# Patient Record
Sex: FEMALE | Race: WHITE | NOT HISPANIC OR LATINO
[De-identification: names, ages, dates, MRNs, and addresses within clinical notes are randomized per-mention and may not be internally consistent; named-entity substitution may affect disease eponyms.]

---

## 2017-05-06 ENCOUNTER — TRANSCRIPTION ENCOUNTER (OUTPATIENT)
Age: 82
End: 2017-05-06

## 2017-05-16 ENCOUNTER — TRANSCRIPTION ENCOUNTER (OUTPATIENT)
Age: 82
End: 2017-05-16

## 2019-04-09 PROBLEM — Z00.00 ENCOUNTER FOR PREVENTIVE HEALTH EXAMINATION: Status: ACTIVE | Noted: 2019-04-09

## 2019-04-09 PROBLEM — M25.551 RIGHT HIP PAIN: Status: ACTIVE | Noted: 2019-04-09

## 2019-04-11 ENCOUNTER — APPOINTMENT (OUTPATIENT)
Dept: ORTHOPEDIC SURGERY | Facility: CLINIC | Age: 84
End: 2019-04-11
Payer: MEDICARE

## 2019-04-11 VITALS
DIASTOLIC BLOOD PRESSURE: 70 MMHG | HEIGHT: 63 IN | SYSTOLIC BLOOD PRESSURE: 136 MMHG | TEMPERATURE: 98 F | HEART RATE: 80 BPM | WEIGHT: 138 LBS | BODY MASS INDEX: 24.45 KG/M2

## 2019-04-11 DIAGNOSIS — M25.551 PAIN IN RIGHT HIP: ICD-10-CM

## 2019-04-11 DIAGNOSIS — Z78.9 OTHER SPECIFIED HEALTH STATUS: ICD-10-CM

## 2019-04-11 DIAGNOSIS — Z87.39 PERSONAL HISTORY OF OTHER DISEASES OF THE MUSCULOSKELETAL SYSTEM AND CONNECTIVE TISSUE: ICD-10-CM

## 2019-04-11 PROCEDURE — 99203 OFFICE O/P NEW LOW 30 MIN: CPT

## 2019-04-11 PROCEDURE — 73502 X-RAY EXAM HIP UNI 2-3 VIEWS: CPT | Mod: RT

## 2019-04-22 NOTE — ADDENDUM
[FreeTextEntry1] : This note was written by Esteban Chakraborty on 04/22/2019, acting as a scribe for Anthony Calvo III, MD

## 2019-04-22 NOTE — PHYSICAL EXAM
[de-identified] : Left Hip: Range of Motion in Degrees:\par 	                                 Claimant:	   Normal:	\par Flexion (Active) 	                 120 	   120-degrees	\par Flexion (Passive)	                 120	   120-degrees	\par Extension (Active)	                 -30	   -30-degrees	\par Extension (Passive)	 -30	   -30-degrees	\par Abduction (Active)	                 45-50	   19-38-hsjjwar	\par Abduction (Passive)	 45-50	   88-68-jhaqyzv	\par Adduction (Active)  	 20-30	   17-03-wybpwpa	\par Adduction (Passive)	 20-30	   87-99-zftpayf	\par Internal Rotation (Active) 	 35	   35-degrees	\par Internal Rotation (Passive)	 35	   35-degrees	\par External Rotation (Active)	 45	   45-degrees	\par External Rotation (Passive)	 45	   45-degrees	\par \par No tenderness with internal or external rotation or axial load.  No tenderness to palpation over the greater trochanter.  Negative Trendelenburg.  No tenderness with resisted abduction.  No weakness to flexion, extension, abduction or adduction.  No evidence of instability.  No motor or sensory deficits.  2+ DP and PT pulses.  Skin is intact.  No scars, rashes or lesions.  \par \par Right Hip: Range of Motion in Degrees:\par 	                                  Claimant:	Normal:	\par Flexion (Active) 	                  120 	                120-degrees	\par Flexion (Passive)	                  120	                120-degrees	\par Extension (Active)	                  -30	                -30-degrees	\par Extension (Passive)	  -30	                -30-degrees	\par Abduction (Active)	                  45-50	                83-10-ftdxojs	\par Abduction (Passive)	  45-50	                01-21-osaxoii	\par Adduction (Active)	                  20-30	                43-10-wmgccoe	\par Adduction (Passive)	  20-30	                87-13-qqtxipz	\par Internal Rotation (Active) 	 10	                35-degrees	\par Internal Rotation (Passive)	 10	                35-degrees	\par External Rotation (Active)	 45	                45-degrees	\par External Rotation (Passive)	 45	                45-degrees	\par \par Tenderness into the groin with internal and external rotation and axial load.  No tenderness to palpation over the greater trochanter.  Negative Trendelenburg.  No tenderness with resisted abduction.  No weakness to flexion, extension, abduction or adduction.  No evidence of instability.  No motor or sensory deficits.  2+ DP and PT pulses.  Skin is intact.  No scars, rashes or lesions.  \par \par  [de-identified] : Gait and Station:  Ambulating with a slightly antalgic to antalgic gait.  Normal Station.  [de-identified] : Appearance:  Well developed, well-nourished female in no acute distress.  [de-identified] : Radiographs, two to three view of the right hip, show moderate-to-early severe degenerative changes.\par

## 2019-04-22 NOTE — DISCUSSION/SUMMARY
[de-identified] : At this time, due to osteoarthritis of the right hip and since she has done physical therapy recently, I recommended a repeat intra-articular injection.  She will be reassessed in 6 weeks.\par

## 2019-04-22 NOTE — CONSULT LETTER
[Consult Letter:] : I had the pleasure of evaluating your patient, [unfilled]. [Dear  ___] : Dear  [unfilled], [Please see my note below.] : Please see my note below. [Consult Closing:] : Thank you very much for allowing me to participate in the care of this patient.  If you have any questions, please do not hesitate to contact me. [Sincerely,] : Sincerely, [FreeTextEntry3] : Anthony Calvo. III, MD

## 2019-04-22 NOTE — HISTORY OF PRESENT ILLNESS
[4] : a current pain level of 4/10 [5] : the ailment interference is 5/10 [3] : the ailment interference is 3/10 [de-identified] : The patient comes in today with complaints of pain to her right hip.  She states it has been going on for many years.  She states years ago, she had an injection that helped, but the pain has now gotten worse.  The patient states the onset/injury occurred over 20 years ago.  This injury is not work related or due to an automobile accident.  The patient states the pain is intermittent.  The patient describes the pain as achy and occasionally sharp.  [de-identified] : long period still (sitting, lying down) [de-identified] : heat and Aleve [] : No

## 2019-05-08 ENCOUNTER — APPOINTMENT (OUTPATIENT)
Age: 84
End: 2019-05-08
Payer: MEDICARE

## 2019-05-08 PROCEDURE — 99203 OFFICE O/P NEW LOW 30 MIN: CPT

## 2019-05-08 RX ORDER — BACILLUS COAGULANS/INULIN 1B-250 MG
CAPSULE ORAL
Refills: 0 | Status: ACTIVE | COMMUNITY

## 2019-05-08 NOTE — PHYSICAL EXAM
[FreeTextEntry3] : Skin examination performed of the face, neck, chest, hands, lower legs;\par The patient is well, alert and oriented, pleasant and cooperative.\par Eyelids, conjunctivae, oral mucosa, digits and nails all normal.  \par No cervical adenopathy.\par \par \par no visible lesions or rashes on trunk, face, UEs; \par

## 2019-05-08 NOTE — HISTORY OF PRESENT ILLNESS
[de-identified] : Rash, itching, intermittent over last 8-10 months;  has seen multiple MDs, had itch relief preps;\par courses of steroids; \par

## 2019-06-04 ENCOUNTER — APPOINTMENT (OUTPATIENT)
Dept: DERMATOLOGY | Facility: CLINIC | Age: 84
End: 2019-06-04
Payer: MEDICARE

## 2019-06-04 PROCEDURE — 99214 OFFICE O/P EST MOD 30 MIN: CPT

## 2019-06-04 NOTE — ASSESSMENT
[FreeTextEntry1] : Chronic urticaria;  now with more visible lesions;\par still severe;\par check CU index;  increase cetirizine to 20/d, may do 30\par t/c prednisone/mycophenolate and/or Xolair if no response

## 2019-06-04 NOTE — PHYSICAL EXAM
[FreeTextEntry3] : Skin examination performed of the face, neck, chest, hands, lower legs;\par The patient is well, alert and oriented, pleasant and cooperative.\par Eyelids, conjunctivae, oral mucosa, digits and nails all normal.  \par No cervical adenopathy.\par \par \par urticarial patches and plaques;  widespread over trunk, medial arms

## 2019-06-12 ENCOUNTER — APPOINTMENT (OUTPATIENT)
Dept: DERMATOLOGY | Facility: CLINIC | Age: 84
End: 2019-06-12

## 2019-06-21 ENCOUNTER — MEDICATION RENEWAL (OUTPATIENT)
Age: 84
End: 2019-06-21

## 2019-07-19 ENCOUNTER — APPOINTMENT (OUTPATIENT)
Dept: DERMATOLOGY | Facility: CLINIC | Age: 84
End: 2019-07-19
Payer: MEDICARE

## 2019-07-19 VITALS — WEIGHT: 138 LBS | BODY MASS INDEX: 24.45 KG/M2 | HEIGHT: 63 IN

## 2019-07-19 PROCEDURE — 99213 OFFICE O/P EST LOW 20 MIN: CPT

## 2019-07-19 RX ORDER — ROSUVASTATIN CALCIUM 10 MG/1
10 TABLET, FILM COATED ORAL
Qty: 60 | Refills: 0 | Status: DISCONTINUED | COMMUNITY
Start: 2018-12-13 | End: 2019-07-19

## 2019-07-19 RX ORDER — CHLORHEXIDINE GLUCONATE, 0.12% ORAL RINSE 1.2 MG/ML
0.12 SOLUTION DENTAL
Qty: 473 | Refills: 0 | Status: COMPLETED | COMMUNITY
Start: 2019-04-28 | End: 2019-07-19

## 2019-07-19 RX ORDER — PREDNISONE 10 MG/1
10 TABLET ORAL
Qty: 16 | Refills: 0 | Status: DISCONTINUED | COMMUNITY
Start: 2018-12-11 | End: 2019-07-19

## 2019-07-19 NOTE — HISTORY OF PRESENT ILLNESS
[de-identified] : Urticaria;  much improved on Prednisone, now at 20/d;\par CU index was negative;\par

## 2019-07-19 NOTE — ASSESSMENT
[FreeTextEntry1] : markedly improved, CU negative\par taper pred slowly;\par 20/10 for 10 days, 20 QOD for 10 days, then 10 qOD until next appt in 1 month\par t/c Xolair if cannot taper\par cont Zyrtec 20 HS

## 2019-08-30 ENCOUNTER — APPOINTMENT (OUTPATIENT)
Dept: DERMATOLOGY | Facility: CLINIC | Age: 84
End: 2019-08-30
Payer: MEDICARE

## 2019-08-30 PROCEDURE — 99213 OFFICE O/P EST LOW 20 MIN: CPT

## 2019-08-30 NOTE — ASSESSMENT
[FreeTextEntry1] : Responding well, controlled with zyrtec, no prednisone;\par may continue 20 HS as long as needed;\par discussed Xolair if continues to have flares;\par f/u prn, discussed annual TBSE

## 2019-08-30 NOTE — HISTORY OF PRESENT ILLNESS
[de-identified] : f/u Urticaria;  tapered prednisone, discontinued over 1 week ago;  still taking zyrtec 20/d;  doing OK

## 2020-02-05 ENCOUNTER — APPOINTMENT (OUTPATIENT)
Dept: ORTHOPEDIC SURGERY | Facility: CLINIC | Age: 85
End: 2020-02-05
Payer: MEDICARE

## 2020-02-05 VITALS
TEMPERATURE: 98.3 F | HEART RATE: 78 BPM | BODY MASS INDEX: 24.63 KG/M2 | WEIGHT: 139 LBS | DIASTOLIC BLOOD PRESSURE: 73 MMHG | SYSTOLIC BLOOD PRESSURE: 140 MMHG | HEIGHT: 63 IN

## 2020-02-05 DIAGNOSIS — M16.11 UNILATERAL PRIMARY OSTEOARTHRITIS, RIGHT HIP: ICD-10-CM

## 2020-02-05 PROCEDURE — 99213 OFFICE O/P EST LOW 20 MIN: CPT

## 2020-02-05 PROCEDURE — 73502 X-RAY EXAM HIP UNI 2-3 VIEWS: CPT | Mod: RT

## 2020-02-11 NOTE — DISCUSSION/SUMMARY
[de-identified] : At this time, due to right hip osteoarthritis, I recommended a repeat intra-articular injection and she will be referred for such.\par

## 2020-02-11 NOTE — HISTORY OF PRESENT ILLNESS
[de-identified] : The patient comes in today with increasing complaints of pain to her right hip.\par

## 2020-02-11 NOTE — ADDENDUM
[FreeTextEntry1] : This note was written by Esteban Chakraborty on 02/11/2020, acting as a scribe for Anthony Calvo III, MD

## 2020-02-11 NOTE — PHYSICAL EXAM
[de-identified] : Gait and Station:  Ambulating with a slightly antalgic to antalgic gait.  Normal Station.  [de-identified] : Left Hip: Range of Motion in Degrees:\par 	                                 Claimant:	   Normal:	\par Flexion (Active) 	                 120 	   120-degrees	\par Flexion (Passive)	                 120	   120-degrees	\par Extension (Active)	                 -30	   -30-degrees	\par Extension (Passive)	 -30	   -30-degrees	\par Abduction (Active)	                 45-50	   61-20-nmrjyxu	\par Abduction (Passive)	 45-50	   04-41-bjyncqe	\par Adduction (Active)  	 20-30	   40-96-guxpiyc	\par Adduction (Passive)	 20-30	   98-82-qqrussg	\par Internal Rotation (Active) 	 35	   35-degrees	\par Internal Rotation (Passive)	 35	   35-degrees	\par External Rotation (Active)	 45	   45-degrees	\par External Rotation (Passive)	 45	   45-degrees	\par \par No tenderness with internal or external rotation or axial load.  No tenderness to palpation over the greater trochanter.  Negative Trendelenburg.  No tenderness with resisted abduction.  No weakness to flexion, extension, abduction or adduction.  No evidence of instability.  No motor or sensory deficits.  2+ DP and PT pulses.  Skin is intact.  No scars, rashes or lesions.  \par  \par Right Hip: Range of Motion in Degrees:\par 	                                  Claimant:	Normal:	\par Flexion (Active) 	                  120 	                120-degrees	\par Flexion (Passive)	                  120	                120-degrees	\par Extension (Active)	                  -30	                -30-degrees	\par Extension (Passive)	  -30	                -30-degrees	\par Abduction (Active)	                  45-50	                43-60-sztxdmc	\par Abduction (Passive)	  45-50	                21-16-vhfwncd	\par Adduction (Active)	                  20-30	                59-11-vmjmnpt	\par Adduction (Passive)	  20-30	                44-17-uhndxwg	\par Internal Rotation (Active) 	 35	                35-degrees	\par Internal Rotation (Passive)	 35	                35-degrees	\par External Rotation (Active)	 45	                45-degrees	\par External Rotation (Passive)	 45	                45-degrees	\par \par Tenderness into the groin with internal and external rotation and axial load.  No tenderness to palpation over the greater trochanter.  Negative Trendelenburg.  No tenderness with resisted abduction.  No weakness to flexion, extension, abduction or adduction.  No evidence of instability.  No motor or sensory deficits.  2+ DP and PT pulses.  Skin is intact.  No scars, rashes or lesions.  \par   [de-identified] : Appearance:  Well developed, well-nourished female in no acute distress.\par   [de-identified] : Radiographs, two to three views of the right hip and pelvis, show moderate degenerative changes.\par \par

## 2020-06-05 ENCOUNTER — APPOINTMENT (OUTPATIENT)
Dept: DERMATOLOGY | Facility: CLINIC | Age: 85
End: 2020-06-05
Payer: MEDICARE

## 2020-06-05 PROCEDURE — 99214 OFFICE O/P EST MOD 30 MIN: CPT

## 2020-06-05 RX ORDER — PREDNISONE 20 MG/1
20 TABLET ORAL
Qty: 60 | Refills: 3 | Status: DISCONTINUED | COMMUNITY
Start: 2019-06-21 | End: 2020-06-05

## 2020-06-05 NOTE — HISTORY OF PRESENT ILLNESS
[de-identified] : Pt. c/o recurrence of itching; all over; similar to last fall; \par taking zyrtec 4 tabs daily;  not sleeping 2' itching; \par Very similar to last episode, which resolved with prednisone\par worsened 2 weeks ago with warmer weather\par \par

## 2020-06-05 NOTE — ASSESSMENT
[FreeTextEntry1] : Urticaria;  flared as well last year in warmer weather; \par \par Therapeutic options and their risks and benefits; along with multiple diagnostic possibilities were discussed at length;\par  \par CU Index was negative in 2019\par continue daily zyrtec;  taking 4/day\par Prednisone Taper;\par f/u 1 month;  recheck;  discussed mycophenolate in 2019, but did not require maintenance;

## 2020-07-06 ENCOUNTER — APPOINTMENT (OUTPATIENT)
Dept: DERMATOLOGY | Facility: CLINIC | Age: 85
End: 2020-07-06
Payer: MEDICARE

## 2020-07-06 DIAGNOSIS — L50.8 OTHER URTICARIA: ICD-10-CM

## 2020-07-06 PROCEDURE — 99213 OFFICE O/P EST LOW 20 MIN: CPT

## 2020-07-06 RX ORDER — FLUTICASONE PROPIONATE 0.5 MG/G
0.05 CREAM TOPICAL
Qty: 1 | Refills: 3 | Status: ACTIVE | COMMUNITY
Start: 2020-07-06 | End: 1900-01-01

## 2020-07-06 RX ORDER — VIT A/VIT C/VIT E/ZINC/COPPER 2148-113
TABLET ORAL
Refills: 0 | Status: ACTIVE | COMMUNITY

## 2020-07-06 RX ORDER — ROSUVASTATIN CALCIUM 5 MG/1
TABLET, FILM COATED ORAL
Refills: 0 | Status: ACTIVE | COMMUNITY

## 2020-07-06 NOTE — HISTORY OF PRESENT ILLNESS
[de-identified] : f/u for chronic urticaria;  worse in hot weather; \par Did very well on prednisone, some itch recurred after finishing, but manageable; \par using zyrtec, Cerave anti itch; \par \par

## 2020-07-06 NOTE — ASSESSMENT
[FreeTextEntry1] : Urticaria;  flared as well last year in warmer weather; improved, but still present; \par \par Therapeutic options and their risks and benefits; along with multiple diagnostic possibilities were discussed at length;\par  \par CU Index was negative in 2019\par continue daily zyrtec;  taking 4/day\par continue cerave, add fluticasone prn topically; \par f/u 2 month;  recheck;  discussed mycophenolate in 2019, but did not require maintenance;

## 2020-09-09 ENCOUNTER — APPOINTMENT (OUTPATIENT)
Dept: DERMATOLOGY | Facility: CLINIC | Age: 85
End: 2020-09-09
Payer: MEDICARE

## 2020-09-09 DIAGNOSIS — R21 RASH AND OTHER NONSPECIFIC SKIN ERUPTION: ICD-10-CM

## 2020-09-09 PROCEDURE — 99213 OFFICE O/P EST LOW 20 MIN: CPT

## 2020-09-09 RX ORDER — PREDNISONE 20 MG/1
20 TABLET ORAL
Qty: 24 | Refills: 0 | Status: DISCONTINUED | COMMUNITY
Start: 2020-06-05 | End: 2020-09-09

## 2020-09-09 RX ORDER — BETAMETHASONE DIPROPIONATE 0.5 MG/G
0.05 CREAM, AUGMENTED TOPICAL
Qty: 2 | Refills: 3 | Status: ACTIVE | COMMUNITY
Start: 2020-09-09 | End: 1900-01-01

## 2020-09-09 NOTE — HISTORY OF PRESENT ILLNESS
[de-identified] : f/u for hives;  taking zyrtec 4-6 pills daily; \par off prednisone since august; \par

## 2020-09-09 NOTE — ASSESSMENT
[FreeTextEntry1] : ? whether residual rash is actually urticaria vs. eczema/asteatosis; \par \par cont zyrtec, cerave;  add diprolene AF cream BID (2 tubes) prn; \par \par typically has improvement in cooler weather;  f/u in November;

## 2020-10-13 ENCOUNTER — APPOINTMENT (OUTPATIENT)
Dept: DERMATOLOGY | Facility: CLINIC | Age: 85
End: 2020-10-13
Payer: MEDICARE

## 2020-10-13 DIAGNOSIS — L20.9 ATOPIC DERMATITIS, UNSPECIFIED: ICD-10-CM

## 2020-10-13 PROCEDURE — 99214 OFFICE O/P EST MOD 30 MIN: CPT

## 2020-10-13 NOTE — HISTORY OF PRESENT ILLNESS
[de-identified] : f/u for check of pruritus;  still having significant daily symptoms, disrupting sleep; \par Itch consistent for nearly 2 years;  \par taking zyrtec daily;  also uses diprolene cream on back

## 2020-10-13 NOTE — ASSESSMENT
[FreeTextEntry1] : Rash/pruritus;  now with more eczematous, not urticarial changes;  this would explain rapid response to prednisone;  last taken in August; \par \par \par Therapeutic options and their risks and benefits; along with multiple diagnostic possibilities were discussed at length;\par \par Discussed Dupixent for persistent generalized itching;  r/b discussed at length;  Will send to [facility]\par in meantime, continue topical steroid\par

## 2020-10-13 NOTE — PHYSICAL EXAM
[FreeTextEntry3] : Erythematous scaling patches with inflammation - over most of volar forearms, antecubital, also on trunk;  \par hands clear;  \par

## 2020-10-20 ENCOUNTER — APPOINTMENT (OUTPATIENT)
Dept: DERMATOLOGY | Facility: CLINIC | Age: 85
End: 2020-10-20
Payer: MEDICARE

## 2020-10-20 PROCEDURE — 99212 OFFICE O/P EST SF 10 MIN: CPT | Mod: 25

## 2020-10-20 PROCEDURE — 96372 THER/PROPH/DIAG INJ SC/IM: CPT

## 2020-11-09 ENCOUNTER — RESULT REVIEW (OUTPATIENT)
Age: 85
End: 2020-11-09

## 2020-11-09 ENCOUNTER — APPOINTMENT (OUTPATIENT)
Dept: DERMATOLOGY | Facility: CLINIC | Age: 85
End: 2020-11-09
Payer: MEDICARE

## 2020-11-09 PROCEDURE — 17110 DESTRUCTION B9 LES UP TO 14: CPT

## 2020-11-09 PROCEDURE — 99212 OFFICE O/P EST SF 10 MIN: CPT | Mod: 25

## 2020-11-18 ENCOUNTER — APPOINTMENT (OUTPATIENT)
Dept: DERMATOLOGY | Facility: CLINIC | Age: 85
End: 2020-11-18

## 2021-01-19 ENCOUNTER — APPOINTMENT (OUTPATIENT)
Dept: DERMATOLOGY | Facility: CLINIC | Age: 86
End: 2021-01-19
Payer: MEDICARE

## 2021-01-19 PROCEDURE — 99213 OFFICE O/P EST LOW 20 MIN: CPT

## 2021-04-07 RX ORDER — DUPILUMAB 300 MG/2ML
300 INJECTION, SOLUTION SUBCUTANEOUS
Qty: 1 | Refills: 0 | Status: DISCONTINUED | COMMUNITY
Start: 2020-10-13 | End: 2021-04-07

## 2021-04-07 RX ORDER — DUPILUMAB 300 MG/2ML
300 INJECTION, SOLUTION SUBCUTANEOUS
Qty: 3 | Refills: 1 | Status: ACTIVE | COMMUNITY
Start: 2020-10-13 | End: 1900-01-01

## 2021-08-02 ENCOUNTER — APPOINTMENT (OUTPATIENT)
Dept: OPHTHALMOLOGY | Facility: CLINIC | Age: 86
End: 2021-08-02
Payer: MEDICARE

## 2021-08-02 ENCOUNTER — NON-APPOINTMENT (OUTPATIENT)
Age: 86
End: 2021-08-02

## 2021-08-02 PROCEDURE — 92134 CPTRZ OPH DX IMG PST SGM RTA: CPT

## 2021-08-02 PROCEDURE — 92004 COMPRE OPH EXAM NEW PT 1/>: CPT | Mod: 57

## 2021-08-02 PROCEDURE — 66821 AFTER CATARACT LASER SURGERY: CPT | Mod: LT

## 2021-08-30 ENCOUNTER — NON-APPOINTMENT (OUTPATIENT)
Age: 86
End: 2021-08-30

## 2021-08-30 ENCOUNTER — APPOINTMENT (OUTPATIENT)
Dept: OPHTHALMOLOGY | Facility: CLINIC | Age: 86
End: 2021-08-30
Payer: MEDICARE

## 2021-08-30 PROCEDURE — 99024 POSTOP FOLLOW-UP VISIT: CPT

## 2021-09-09 ENCOUNTER — APPOINTMENT (OUTPATIENT)
Dept: OPHTHALMOLOGY | Facility: CLINIC | Age: 86
End: 2021-09-09

## 2021-10-07 ENCOUNTER — APPOINTMENT (OUTPATIENT)
Dept: OPHTHALMOLOGY | Facility: CLINIC | Age: 86
End: 2021-10-07
Payer: MEDICARE

## 2021-10-07 ENCOUNTER — NON-APPOINTMENT (OUTPATIENT)
Age: 86
End: 2021-10-07

## 2021-10-07 PROCEDURE — 99024 POSTOP FOLLOW-UP VISIT: CPT

## 2021-10-07 PROCEDURE — 92134 CPTRZ OPH DX IMG PST SGM RTA: CPT

## 2022-10-06 ENCOUNTER — NON-APPOINTMENT (OUTPATIENT)
Age: 87
End: 2022-10-06

## 2023-05-08 ENCOUNTER — NON-APPOINTMENT (OUTPATIENT)
Age: 88
End: 2023-05-08

## 2023-05-08 PROCEDURE — 92285 EXTERNAL OCULAR PHOTOGRAPHY: CPT

## 2023-05-08 PROCEDURE — 92012 INTRM OPH EXAM EST PATIENT: CPT

## 2023-05-16 ENCOUNTER — APPOINTMENT (OUTPATIENT)
Dept: OPHTHALMOLOGY | Facility: CLINIC | Age: 88
End: 2023-05-16

## 2023-06-05 ENCOUNTER — APPOINTMENT (OUTPATIENT)
Dept: OPHTHALMOLOGY | Facility: CLINIC | Age: 88
End: 2023-06-05

## 2023-06-12 ENCOUNTER — APPOINTMENT (OUTPATIENT)
Dept: OPHTHALMOLOGY | Facility: CLINIC | Age: 88
End: 2023-06-12
Payer: MEDICARE

## 2023-11-09 ENCOUNTER — OFFICE (OUTPATIENT)
Dept: URBAN - METROPOLITAN AREA CLINIC 105 | Facility: CLINIC | Age: 88
Setting detail: OPHTHALMOLOGY
End: 2023-11-09
Payer: MEDICARE

## 2023-11-09 ENCOUNTER — RX ONLY (RX ONLY)
Age: 88
End: 2023-11-09

## 2023-11-09 DIAGNOSIS — H43.822: ICD-10-CM

## 2023-11-09 DIAGNOSIS — H11.151: ICD-10-CM

## 2023-11-09 DIAGNOSIS — H35.30: ICD-10-CM

## 2023-11-09 PROBLEM — Z96.1 PSEUDOPHAKIA ; BOTH EYES: Status: ACTIVE | Noted: 2023-11-09

## 2023-11-09 PROBLEM — M31.6 TEMPORAL ARTERITIS ; LEFT EYE: Status: ACTIVE | Noted: 2023-11-09

## 2023-11-09 PROBLEM — H47.012 ISCHAEMIC OPTIC NEUROPATHY; LEFT EYE: Status: ACTIVE | Noted: 2023-11-09

## 2023-11-09 PROBLEM — H11.042 PTERYGIUM; LEFT EYE: Status: ACTIVE | Noted: 2023-11-09

## 2023-11-09 PROCEDURE — 99203 OFFICE O/P NEW LOW 30 MIN: CPT | Performed by: REGISTERED NURSE

## 2023-11-09 PROCEDURE — 92250 FUNDUS PHOTOGRAPHY W/I&R: CPT | Performed by: REGISTERED NURSE

## 2023-11-09 ASSESSMENT — REFRACTION_CURRENTRX
OS_OVR_VA: 20/
OD_ADD: +1.75
OS_ADD: +1.75
OD_OVR_VA: 20/

## 2023-11-09 ASSESSMENT — REFRACTION_AUTOREFRACTION
OD_CYLINDER: -1.75
OS_CYLINDER: -2.25
OD_SPHERE: 0.00
OD_AXIS: 092
OS_AXIS: 086
OS_SPHERE: +0.25

## 2023-11-09 ASSESSMENT — SPHEQUIV_DERIVED
OD_SPHEQUIV: -0.875
OS_SPHEQUIV: -0.875

## 2023-11-09 ASSESSMENT — CONFRONTATIONAL VISUAL FIELD TEST (CVF)
OS_FINDINGS: FULL
OD_FINDINGS: FULL

## 2023-11-09 ASSESSMENT — CORNEAL PTERYGIUM: OS_PTERYGIUM: TEMPORAL 1MM

## 2025-03-17 ENCOUNTER — TRANSCRIPTION ENCOUNTER (OUTPATIENT)
Age: 89
End: 2025-03-17

## 2025-05-07 NOTE — PHYSICAL EXAM
[FreeTextEntry1] : Venipuncture done in our office, Labs sent out/further recommendations will be made based on lab results.Patient is to continue present medications with diet/exercise and specialist followup.Patient will return to the office in 4months   vaccines are UTD/ +got covid vaccines Bone density was 9/17=declines followup Mammogram 1/2019-six-month right mammogram and sonogram=declines any followup MG Colonoscopy was 8/2023 specialists include 1.ophthalmology+in [place] 2. GYN-Dr. Nevaeh Riley 3. GI-Dr. Guzman 4.cardiothoracic= Dr. Martin-[facility] FOLLOWUP 5.ENT p.r.n.-Dr. Fernandez 6.Cardio-Dr. Monahan 7.Podiatry prn-Dr. Acosta 8.Neuro-Dr. Padron echo via cardio CTA of the chest 11/2023 CT CA score=11=2/2022 no Indication for hepatitis C screen.   [FreeTextEntry3] : Faint urticaria over trunk, UEs; \par